# Patient Record
Sex: MALE | Race: WHITE
[De-identification: names, ages, dates, MRNs, and addresses within clinical notes are randomized per-mention and may not be internally consistent; named-entity substitution may affect disease eponyms.]

---

## 2017-02-21 ENCOUNTER — HOSPITAL ENCOUNTER (OUTPATIENT)
Dept: HOSPITAL 57 - HPCALD | Age: 67
Discharge: HOME | End: 2017-02-21
Attending: FAMILY MEDICINE
Payer: MEDICARE

## 2017-02-21 DIAGNOSIS — R10.9: Primary | ICD-10-CM

## 2017-02-21 LAB
ALP SERPL-CCNC: 96 U/L (ref 40–150)
ALT SERPL W P-5'-P-CCNC: 40 U/L (ref 0–55)
ANION GAP SERPL CALC-SCNC: 18 MMOL/L (ref 10–20)
AST SERPL-CCNC: 23 U/L (ref 5–34)
BASOPHILS # BLD AUTO: 0.1 THOU/UL (ref 0–0.2)
BASOPHILS NFR BLD AUTO: 1.1 % (ref 0–1)
BILIRUB SERPL-MCNC: 0.5 MG/DL (ref 0.2–1.2)
BUN SERPL-MCNC: 18 MG/DL (ref 8.4–25.7)
CALCIUM SERPL-MCNC: 9.9 MG/DL (ref 7.8–10.44)
CHLORIDE SERPL-SCNC: 94 MMOL/L (ref 98–107)
CO2 SERPL-SCNC: 26 MMOL/L (ref 23–31)
CREAT CL PREDICTED SERPL C-G-VRATE: 0 ML/MIN (ref 70–130)
EOSINOPHIL # BLD AUTO: 0.1 THOU/UL (ref 0–0.7)
EOSINOPHIL NFR BLD AUTO: 0.8 % (ref 0–10)
GLOBULIN SER CALC-MCNC: 3 G/DL (ref 2.4–3.5)
HCT VFR BLD CALC: 56.7 % (ref 42–52)
LIPASE SERPL-CCNC: 60 U/L (ref 8–78)
LYMPHOCYTES # BLD AUTO: 1.6 THOU/UL (ref 1.2–3.4)
MONOCYTES # BLD AUTO: 0.5 THOU/UL (ref 0.11–0.59)
MONOCYTES NFR BLD AUTO: 6 % (ref 0–10)
NEUTROPHILS # BLD AUTO: 6.1 THOU/UL (ref 1.4–6.5)
RBC # BLD AUTO: 6.06 MILL/UL (ref 4.7–6.1)
WBC # BLD AUTO: 8.4 THOU/UL (ref 4.8–10.8)

## 2017-02-21 PROCEDURE — 83690 ASSAY OF LIPASE: CPT

## 2017-02-21 PROCEDURE — 83036 HEMOGLOBIN GLYCOSYLATED A1C: CPT

## 2017-02-21 PROCEDURE — 85025 COMPLETE CBC W/AUTO DIFF WBC: CPT

## 2017-02-21 PROCEDURE — 36415 COLL VENOUS BLD VENIPUNCTURE: CPT

## 2017-02-21 PROCEDURE — 80053 COMPREHEN METABOLIC PANEL: CPT

## 2017-02-23 ENCOUNTER — HOSPITAL ENCOUNTER (OUTPATIENT)
Dept: HOSPITAL 57 - BURCT | Age: 67
Discharge: HOME | End: 2017-02-23
Attending: FAMILY MEDICINE
Payer: MEDICARE

## 2017-02-23 DIAGNOSIS — N20.0: ICD-10-CM

## 2017-02-23 DIAGNOSIS — R10.9: Primary | ICD-10-CM

## 2017-02-23 DIAGNOSIS — K76.0: ICD-10-CM

## 2017-02-23 PROCEDURE — 74177 CT ABD & PELVIS W/CONTRAST: CPT

## 2017-02-23 NOTE — CT
CT ABDOMEN AND PELVIS WITH CONTRAST:

 

Date: 

02/23/17 

 

Spiral CT of the abdomen and pelvis was performed for evaluation of umbilical and abdominal pain in 
a patient with a prior history of prostate cancer. There is also recent history of diarrhea. Axial s
lices were acquired after giving both oral and IV contrast. The amount of IV contrast was restricted
 to 60 mL due to the patient's GFR. Isovue was used. Axial slices were acquired, then coronal recons
tructions were done. There are no prior scans available for comparison. 

 

FINDINGS:

The lung bases are clear. A small cyst or pneumatocele is seen in the left lower lobe and is of no c
oncern. 

 

The liver is normal in size, but slightly low in density. There is probably some mild fatty infiltra
tion. The gallbladder contained no obvious calcifications. The spleen and pancreas appear normal. Jose
th adrenal glands seem hypertrophied, but the left adrenal is more bulbous and I believe it has at l
east a 2.3 cm mass in it. The average CT density was 9-12 units, making it very likely that this is 
an adenoma. The kidneys show no solid mass or hydronephrosis. A few nonobstructing calculi are sugge
sted on the left kidney. The aorta shows mild arteriosclerotic change with no aneurysm. 

 

The bowel is nondistended. There is no sign of obstruction. There is no aga-inflammatory streaking 
around bowel or thickening of walls. The only area that was even questionably thickened would be the
 duodenum and proximal jejunum, and this is an equivocal finding at best. There were no signs of div
erticulitis or colitis. No free air or free fluid seen. 

 

The patient does have a small fat-filled umbilical hernia immediately superior to the umbilical bri
on. No bowel is within it and its neck is rather wide. 

 

CT of the pelvis showed no pelvic masses, fluid collections, or adenopathy. 

 

No lytic or blastic lesions were seen within the bony pelvis. The vertebra appear intact. There is d
egenerative disc disease at several different levels, but is most prominent at L4-L5 and L5-S1. Face
t arthritis at these levels is prominent as well. 

 

IMPRESSION: 

1.  Small fat-filled periumbilical hernia. 

 

2.  Mild diffuse fatty infiltration of the liver. 

 

3.  Nonobstructing left renal calculi. 

 

4.  No evidence of colitis or diverticulitis. 

 

 

 

POS: HOME

## 2018-09-06 ENCOUNTER — HOSPITAL ENCOUNTER (OUTPATIENT)
Dept: HOSPITAL 92 - BICCT | Age: 68
Discharge: HOME | End: 2018-09-06
Attending: SURGERY
Payer: MEDICARE

## 2018-09-06 DIAGNOSIS — E27.8: ICD-10-CM

## 2018-09-06 DIAGNOSIS — K42.9: ICD-10-CM

## 2018-09-06 DIAGNOSIS — K40.20: ICD-10-CM

## 2018-09-06 DIAGNOSIS — N20.0: ICD-10-CM

## 2018-09-06 DIAGNOSIS — I70.8: ICD-10-CM

## 2018-09-06 DIAGNOSIS — R10.9: Primary | ICD-10-CM

## 2018-09-06 LAB — ESTIMATED GFR-MDRD - POC: 75

## 2018-09-06 PROCEDURE — 82565 ASSAY OF CREATININE: CPT

## 2018-09-06 PROCEDURE — 74177 CT ABD & PELVIS W/CONTRAST: CPT

## 2020-01-21 ENCOUNTER — HOSPITAL ENCOUNTER (OUTPATIENT)
Dept: HOSPITAL 57 - BURRAD | Age: 70
Discharge: HOME | End: 2020-01-21
Attending: FAMILY MEDICINE
Payer: MEDICARE

## 2020-01-21 DIAGNOSIS — M25.561: Primary | ICD-10-CM

## 2020-01-21 DIAGNOSIS — M17.11: ICD-10-CM

## 2020-01-21 NOTE — RAD
RIGHT KNEE FOUR VIEWS:

1/21/20

 

No acute fracture was seen. There is medial joint space narrowing and some very small osteophytes. Th
ere does appear to be a small bony fragment in the central portion of the joint. This could be from o
ld trauma or an old ligamentous injury. MRI would best show any internal derangement of the knee. 

 

IMPRESSION: 

Degenerative changes as noted with possible loose body. 

 

POS: HOME

## 2020-05-12 ENCOUNTER — HOSPITAL ENCOUNTER (OUTPATIENT)
Dept: HOSPITAL 57 - BURRAD | Age: 70
Discharge: HOME | End: 2020-05-12
Attending: FAMILY MEDICINE
Payer: MEDICARE

## 2020-05-12 DIAGNOSIS — M51.37: ICD-10-CM

## 2020-05-12 DIAGNOSIS — M51.16: Primary | ICD-10-CM

## 2020-05-12 PROCEDURE — 72100 X-RAY EXAM L-S SPINE 2/3 VWS: CPT

## 2020-05-12 NOTE — RAD
LUMBAR SPINE 3 VIEWS:

 

DATE: 5/12/2020.

 

FINDINGS: 

No fracture or disk space narrowing was seen.  Degenerated disks with vacuum phenomenon are seen at L
4-L5 and L5-S1.  Anterior osteophytes are prominent at most lumbar levels.  The aorta is calcified.  
There may be some minor sclerosis of the SI joints, but they are not seen symmetrically.  Slight curv
ature of the spine convex left could be positional.  The hip joints were equal in width.

 

IMPRESSION: 

Degenerative disk disease, especially in the lower lumbar levels.

 

POS: HOME

## 2020-05-12 NOTE — RAD
RIGHT HIP 2 VIEWS:

 

DATE: 5/12/2020.

 

FINDINGS: 

No fracture or area of bony destruction was seen.  There are no substantial degenerative changes.  Th
e joint space is normal in width and the articular surfaces are smooth.  The adjacent pubic ring appe
ars intact.  Femoral artery calcifications are evident.

 

IMPRESSION: 

No acute findings.

 

POS: HOME